# Patient Record
Sex: MALE | Race: WHITE | Employment: FULL TIME | URBAN - METROPOLITAN AREA
[De-identification: names, ages, dates, MRNs, and addresses within clinical notes are randomized per-mention and may not be internally consistent; named-entity substitution may affect disease eponyms.]

---

## 2018-09-06 ENCOUNTER — HOSPITAL ENCOUNTER (OUTPATIENT)
Dept: PREADMISSION TESTING | Age: 63
Discharge: HOME | End: 2018-09-06
Payer: COMMERCIAL

## 2018-09-06 ENCOUNTER — HOSPITAL ENCOUNTER (OUTPATIENT)
Dept: GENERAL RADIOLOGY | Age: 63
Discharge: HOME | End: 2018-09-06
Attending: UROLOGY
Payer: COMMERCIAL

## 2018-09-06 VITALS
DIASTOLIC BLOOD PRESSURE: 67 MMHG | SYSTOLIC BLOOD PRESSURE: 117 MMHG | HEART RATE: 57 BPM | RESPIRATION RATE: 16 BRPM | WEIGHT: 156.53 LBS | HEIGHT: 67 IN | TEMPERATURE: 98.2 F | OXYGEN SATURATION: 97 % | BODY MASS INDEX: 24.57 KG/M2

## 2018-09-06 LAB
ALBUMIN SERPL-MCNC: 3.8 G/DL (ref 3.5–5)
ALBUMIN/GLOB SERPL: 1.2 {RATIO} (ref 1.1–2.2)
ALP SERPL-CCNC: 60 U/L (ref 45–117)
ALT SERPL-CCNC: 23 U/L (ref 12–78)
ANION GAP SERPL CALC-SCNC: 7 MMOL/L (ref 5–15)
APPEARANCE UR: ABNORMAL
AST SERPL-CCNC: 16 U/L (ref 15–37)
BACTERIA URNS QL MICRO: ABNORMAL /HPF
BILIRUB SERPL-MCNC: 0.2 MG/DL (ref 0.2–1)
BILIRUB UR QL: NEGATIVE
BUN SERPL-MCNC: 15 MG/DL (ref 6–20)
BUN/CREAT SERPL: 12 (ref 12–20)
CALCIUM SERPL-MCNC: 8.5 MG/DL (ref 8.5–10.1)
CHLORIDE SERPL-SCNC: 105 MMOL/L (ref 97–108)
CO2 SERPL-SCNC: 27 MMOL/L (ref 21–32)
COLOR UR: ABNORMAL
CREAT SERPL-MCNC: 1.26 MG/DL (ref 0.7–1.3)
EPITH CASTS URNS QL MICRO: ABNORMAL /LPF
ERYTHROCYTE [DISTWIDTH] IN BLOOD BY AUTOMATED COUNT: 12.3 % (ref 11.5–14.5)
GLOBULIN SER CALC-MCNC: 3.3 G/DL (ref 2–4)
GLUCOSE SERPL-MCNC: 73 MG/DL (ref 65–100)
GLUCOSE UR STRIP.AUTO-MCNC: NEGATIVE MG/DL
HCT VFR BLD AUTO: 36.9 % (ref 36.6–50.3)
HGB BLD-MCNC: 12.2 G/DL (ref 12.1–17)
HGB UR QL STRIP: ABNORMAL
KETONES UR QL STRIP.AUTO: NEGATIVE MG/DL
LEUKOCYTE ESTERASE UR QL STRIP.AUTO: ABNORMAL
MCH RBC QN AUTO: 29.9 PG (ref 26–34)
MCHC RBC AUTO-ENTMCNC: 33.1 G/DL (ref 30–36.5)
MCV RBC AUTO: 90.4 FL (ref 80–99)
NITRITE UR QL STRIP.AUTO: NEGATIVE
NRBC # BLD: 0 K/UL (ref 0–0.01)
NRBC BLD-RTO: 0 PER 100 WBC
PH UR STRIP: 5.5 [PH] (ref 5–8)
PLATELET # BLD AUTO: 214 K/UL (ref 150–400)
PMV BLD AUTO: 10.4 FL (ref 8.9–12.9)
POTASSIUM SERPL-SCNC: 4 MMOL/L (ref 3.5–5.1)
PROT SERPL-MCNC: 7.1 G/DL (ref 6.4–8.2)
PROT UR STRIP-MCNC: NEGATIVE MG/DL
RBC # BLD AUTO: 4.08 M/UL (ref 4.1–5.7)
RBC #/AREA URNS HPF: ABNORMAL /HPF (ref 0–5)
SODIUM SERPL-SCNC: 139 MMOL/L (ref 136–145)
SP GR UR REFRACTOMETRY: 1.01 (ref 1–1.03)
UROBILINOGEN UR QL STRIP.AUTO: 0.2 EU/DL (ref 0.2–1)
WBC # BLD AUTO: 6 K/UL (ref 4.1–11.1)
WBC URNS QL MICRO: >100 /HPF (ref 0–4)

## 2018-09-06 PROCEDURE — 87086 URINE CULTURE/COLONY COUNT: CPT | Performed by: UROLOGY

## 2018-09-06 PROCEDURE — 81001 URINALYSIS AUTO W/SCOPE: CPT | Performed by: UROLOGY

## 2018-09-06 PROCEDURE — 93005 ELECTROCARDIOGRAM TRACING: CPT

## 2018-09-06 PROCEDURE — 80053 COMPREHEN METABOLIC PANEL: CPT | Performed by: UROLOGY

## 2018-09-06 PROCEDURE — 85027 COMPLETE CBC AUTOMATED: CPT | Performed by: UROLOGY

## 2018-09-06 PROCEDURE — 71046 X-RAY EXAM CHEST 2 VIEWS: CPT

## 2018-09-06 PROCEDURE — 36415 COLL VENOUS BLD VENIPUNCTURE: CPT | Performed by: UROLOGY

## 2018-09-06 RX ORDER — SODIUM CHLORIDE, SODIUM LACTATE, POTASSIUM CHLORIDE, CALCIUM CHLORIDE 600; 310; 30; 20 MG/100ML; MG/100ML; MG/100ML; MG/100ML
25 INJECTION, SOLUTION INTRAVENOUS CONTINUOUS
Status: CANCELLED | OUTPATIENT
Start: 2018-09-17

## 2018-09-06 RX ORDER — CEFAZOLIN SODIUM/WATER 2 G/20 ML
2 SYRINGE (ML) INTRAVENOUS ONCE
Status: CANCELLED | OUTPATIENT
Start: 2018-09-17 | End: 2018-09-17

## 2018-09-06 RX ORDER — TAMSULOSIN HYDROCHLORIDE 0.4 MG/1
0.4 CAPSULE ORAL
COMMUNITY

## 2018-09-06 NOTE — PERIOP NOTES
Spoke with lab to verify gray top for urine culture had been received. Lab confirmed receipt of gray top for urine culture and urine culture would be run. 
 
1800 Again spoke with lab to verify urine culture to be in process.

## 2018-09-06 NOTE — PERIOP NOTES
[facility] Preoperative Instructions Surgery Date 9/17/18         Time of Arrival 0630 am   Contact # [phone] 1. On the day of your surgery, please report to the Surgical Services Registration Desk and sign in at your designated time. The Surgery Center is located to the right of the Emergency Room. 2. You must have someone with you to drive you home. You should not drive a car for 24 hours following surgery. Please make arrangements for a friend or family member to stay with you for the first 24 hours after your surgery. 3. Do not have anything to eat or drink (including water, gum, mints, coffee, juice) after midnight ? 9/16/18  . ? This may not apply to medications prescribed by your physician. ?(Please note below the special instructions with medications to take the morning of your procedure.) 4. We recommend you do not drink any alcoholic beverages for 24 hours before and after your surgery. 5. Contact your surgeons office for instructions on the following medications: non-steroidal anti-inflammatory drugs (i.e. Advil, Aleve), vitamins, and supplements. (Some surgeons will want you to stop these medications prior to surgery and others may allow you to take them) **If you are currently taking Plavix, Coumadin, Aspirin and/or other blood-thinning agents, contact your surgeon for instructions. ** Your surgeon will partner with the physician prescribing these medications to determine if it is safe to stop or if you need to continue taking. Please do not stop taking these medications without instructions from your surgeon 6. Wear comfortable clothes. Wear glasses instead of contacts. Do not bring any money or jewelry. Please bring picture ID, insurance card, and any prearranged co-payment or hospital payment. Do not wear make-up, particularly mascara the morning of your surgery.   Do not wear nail polish, particularly if you are having foot /hand surgery. Wear your hair loose or down, no ponytails, buns, laurie pins or clips. All body piercings must be removed. Please shower with antibacterial soap for three consecutive days before and on the morning of surgery, but do not apply any lotions, powders or deodorants after the shower on the day of surgery. Please use a fresh towels after each shower. Please sleep in clean clothes and change bed linens the night before surgery. Please do not shave for 48 hours prior to surgery. Shaving of the face is acceptable. 7. You should understand that if you do not follow these instructions your surgery may be cancelled. If your physical condition changes (I.e. fever, cold or flu) please contact your surgeon as soon as possible. 8. It is important that you be on time. If a situation occurs where you may be late, please call [phone] (OR Holding Area). 9. If you have any questions and or problems, please call [phone] (Pre-admission Testing). 10. Your surgery time may be subject to change. You will receive a phone call the evening prior if your time changes. 11.  If having outpatient surgery, you must have someone to drive you here, stay with you during the duration of your stay, and to drive you home at time of discharge. 12.   In an effort to improve the efficiency, privacy, and safety for all of our Pre-op patients visitors are not allowed in the Holding area. Once you arrive and are registered your family/visitors will be asked to remain in the waiting room. The Pre-op staff will get you from the Surgical Waiting Area and will explain to you and your family/visitors that the Pre-op phase is beginning. The staff will answer any questions and provide instructions for tracking of the patient, by use of the existing tracking number and color-coded status board in the waiting room.   At this time the staff will also ask for your designated spokesperson information in the event that the physician or staff need to provide an update or obtain any pertinent information. The designated spokesperson will be notified if the physician needs to speak to family during the pre-operative phase. If at any time your family/visitors has questions or concerns they may approach the volunteer desk in the waiting area for assistance. Special Instructions: Use Incentive Spirometer 20 times daily prior to surgery. MEDICATIONS TO TAKE THE MORNING OF SURGERY WITH A SIP OF WATER: Flonase if needed I understand a pre-operative phone call will be made to verify my surgery time. In the event that I am not available, I give permission for a message to be left on my answering service and/or with another person? yes 
 
 
 
 ___________________      __________   _________ 
  (Signature of Patient)             (Witness)                (Date and Time)

## 2018-09-06 NOTE — PERIOP NOTES
Incentive Benito James Using the incentive spirometer helps expand the small air sacs of your lungs, helps you breathe deeply, and helps improve your lung function. Use your incentive spirometer twice a day (10 breaths each time) prior to surgery. How to Use Your Incentive Spirometer: 1. Hold the incentive spirometer in an upright position. 2. Breathe out as usual.  
3. Place the mouthpiece in your mouth and seal your lips tightly around it. 4. Take a deep breath. Breathe in slowly and as deeply as possible. Keep the blue flow rate guide between the arrows. 5. Hold your breath as long as possible. Then exhale slowly and allow the piston to fall to the bottom of the column. 6. Rest for a few seconds and repeat steps one through five at least 10 times. PAT Tidal Volume________2500__________  x_________10_______  Date______9/6/18_________________ BRING THE INCENTIVE SPIROMETER WITH YOU TO THE HOSPITAL ON THE DAY OF YOUR SURGERY. Opportunity given to ask and answer questions as well as to observe return demonstration. Patient signature_____________________________    Witness____________________________

## 2018-09-07 LAB
ATRIAL RATE: 54 BPM
CALCULATED P AXIS, ECG09: 58 DEGREES
CALCULATED R AXIS, ECG10: 6 DEGREES
CALCULATED T AXIS, ECG11: 33 DEGREES
DIAGNOSIS, 93000: NORMAL
P-R INTERVAL, ECG05: 148 MS
Q-T INTERVAL, ECG07: 408 MS
QRS DURATION, ECG06: 88 MS
QTC CALCULATION (BEZET), ECG08: 386 MS
VENTRICULAR RATE, ECG03: 54 BPM

## 2018-09-08 LAB
BACTERIA SPEC CULT: NORMAL
CC UR VC: NORMAL
SERVICE CMNT-IMP: NORMAL

## 2018-09-17 ENCOUNTER — ANESTHESIA (OUTPATIENT)
Dept: SURGERY | Age: 63
End: 2018-09-17
Payer: COMMERCIAL

## 2018-09-17 ENCOUNTER — HOSPITAL ENCOUNTER (OUTPATIENT)
Age: 63
Discharge: HOME | End: 2018-09-18
Attending: UROLOGY | Admitting: UROLOGY
Payer: COMMERCIAL

## 2018-09-17 ENCOUNTER — ANESTHESIA EVENT (OUTPATIENT)
Dept: SURGERY | Age: 63
End: 2018-09-17
Payer: COMMERCIAL

## 2018-09-17 DIAGNOSIS — Z90.79 S/P TURP: ICD-10-CM

## 2018-09-17 DIAGNOSIS — N32.0 BLADDER OUTLET OBSTRUCTION: Primary | ICD-10-CM

## 2018-09-17 DIAGNOSIS — N40.1 URINARY RETENTION DUE TO BENIGN PROSTATIC HYPERPLASIA: ICD-10-CM

## 2018-09-17 DIAGNOSIS — R33.8 URINARY RETENTION DUE TO BENIGN PROSTATIC HYPERPLASIA: ICD-10-CM

## 2018-09-17 LAB
ANION GAP SERPL CALC-SCNC: 5 MMOL/L (ref 5–15)
BUN SERPL-MCNC: 13 MG/DL (ref 6–20)
BUN/CREAT SERPL: 8 (ref 12–20)
CALCIUM SERPL-MCNC: 9.1 MG/DL (ref 8.5–10.1)
CHLORIDE SERPL-SCNC: 104 MMOL/L (ref 97–108)
CO2 SERPL-SCNC: 28 MMOL/L (ref 21–32)
CREAT SERPL-MCNC: 1.54 MG/DL (ref 0.7–1.3)
GLUCOSE SERPL-MCNC: 123 MG/DL (ref 65–100)
HCT VFR BLD AUTO: 40.1 % (ref 36.6–50.3)
HGB BLD-MCNC: 13.2 G/DL (ref 12.1–17)
POTASSIUM SERPL-SCNC: 3.9 MMOL/L (ref 3.5–5.1)
SODIUM SERPL-SCNC: 137 MMOL/L (ref 136–145)

## 2018-09-17 PROCEDURE — 77030005546 HC CATH URETH FOL 3W BARD -A: Performed by: UROLOGY

## 2018-09-17 PROCEDURE — 74011250637 HC RX REV CODE- 250/637: Performed by: UROLOGY

## 2018-09-17 PROCEDURE — 77030010545: Performed by: UROLOGY

## 2018-09-17 PROCEDURE — 74011250636 HC RX REV CODE- 250/636: Performed by: UROLOGY

## 2018-09-17 PROCEDURE — 77030018836 HC SOL IRR NACL ICUM -A: Performed by: UROLOGY

## 2018-09-17 PROCEDURE — 85018 HEMOGLOBIN: CPT | Performed by: UROLOGY

## 2018-09-17 PROCEDURE — 77030008684 HC TU ET CUF COVD -B: Performed by: ANESTHESIOLOGY

## 2018-09-17 PROCEDURE — 77030028158 HC ELECTRD BISOLSR PROST RWOL -B: Performed by: UROLOGY

## 2018-09-17 PROCEDURE — 77030020782 HC GWN BAIR PAWS FLX 3M -B

## 2018-09-17 PROCEDURE — 74011250636 HC RX REV CODE- 250/636

## 2018-09-17 PROCEDURE — 74011250636 HC RX REV CODE- 250/636: Performed by: ANESTHESIOLOGY

## 2018-09-17 PROCEDURE — 76010000153 HC OR TIME 1.5 TO 2 HR: Performed by: UROLOGY

## 2018-09-17 PROCEDURE — 36415 COLL VENOUS BLD VENIPUNCTURE: CPT | Performed by: UROLOGY

## 2018-09-17 PROCEDURE — 77030021163 HC TUBE CYSTO IRR ICUM -A: Performed by: UROLOGY

## 2018-09-17 PROCEDURE — 80048 BASIC METABOLIC PNL TOTAL CA: CPT | Performed by: UROLOGY

## 2018-09-17 PROCEDURE — 76060000034 HC ANESTHESIA 1.5 TO 2 HR: Performed by: UROLOGY

## 2018-09-17 PROCEDURE — 77030019908 HC STETH ESOPH SIMS -A: Performed by: ANESTHESIOLOGY

## 2018-09-17 PROCEDURE — 77030026438 HC STYL ET INTUB CARD -A: Performed by: ANESTHESIOLOGY

## 2018-09-17 PROCEDURE — 76210000006 HC OR PH I REC 0.5 TO 1 HR: Performed by: UROLOGY

## 2018-09-17 PROCEDURE — 77030032490 HC SLV COMPR SCD KNE COVD -B: Performed by: UROLOGY

## 2018-09-17 PROCEDURE — 88305 TISSUE EXAM BY PATHOLOGIST: CPT | Performed by: UROLOGY

## 2018-09-17 PROCEDURE — 74011000250 HC RX REV CODE- 250

## 2018-09-17 RX ORDER — CEFAZOLIN SODIUM/WATER 2 G/20 ML
2 SYRINGE (ML) INTRAVENOUS ONCE
Status: COMPLETED | OUTPATIENT
Start: 2018-09-17 | End: 2018-09-17

## 2018-09-17 RX ORDER — FENTANYL CITRATE 50 UG/ML
INJECTION, SOLUTION INTRAMUSCULAR; INTRAVENOUS AS NEEDED
Status: DISCONTINUED | OUTPATIENT
Start: 2018-09-17 | End: 2018-09-17 | Stop reason: HOSPADM

## 2018-09-17 RX ORDER — SODIUM CHLORIDE 0.9 % (FLUSH) 0.9 %
5-10 SYRINGE (ML) INJECTION EVERY 8 HOURS
Status: DISCONTINUED | OUTPATIENT
Start: 2018-09-17 | End: 2018-09-18 | Stop reason: HOSPADM

## 2018-09-17 RX ORDER — DEXAMETHASONE SODIUM PHOSPHATE 4 MG/ML
INJECTION, SOLUTION INTRA-ARTICULAR; INTRALESIONAL; INTRAMUSCULAR; INTRAVENOUS; SOFT TISSUE AS NEEDED
Status: DISCONTINUED | OUTPATIENT
Start: 2018-09-17 | End: 2018-09-17 | Stop reason: HOSPADM

## 2018-09-17 RX ORDER — MIDAZOLAM HYDROCHLORIDE 1 MG/ML
INJECTION, SOLUTION INTRAMUSCULAR; INTRAVENOUS AS NEEDED
Status: DISCONTINUED | OUTPATIENT
Start: 2018-09-17 | End: 2018-09-17 | Stop reason: HOSPADM

## 2018-09-17 RX ORDER — SODIUM CHLORIDE 0.9 % (FLUSH) 0.9 %
5-10 SYRINGE (ML) INJECTION AS NEEDED
Status: DISCONTINUED | OUTPATIENT
Start: 2018-09-17 | End: 2018-09-18 | Stop reason: HOSPADM

## 2018-09-17 RX ORDER — DOCUSATE SODIUM 100 MG/1
100 CAPSULE, LIQUID FILLED ORAL 2 TIMES DAILY
Status: DISCONTINUED | OUTPATIENT
Start: 2018-09-17 | End: 2018-09-18 | Stop reason: HOSPADM

## 2018-09-17 RX ORDER — ROCURONIUM BROMIDE 10 MG/ML
INJECTION, SOLUTION INTRAVENOUS AS NEEDED
Status: DISCONTINUED | OUTPATIENT
Start: 2018-09-17 | End: 2018-09-17 | Stop reason: HOSPADM

## 2018-09-17 RX ORDER — NALOXONE HYDROCHLORIDE 0.4 MG/ML
0.4 INJECTION, SOLUTION INTRAMUSCULAR; INTRAVENOUS; SUBCUTANEOUS AS NEEDED
Status: DISCONTINUED | OUTPATIENT
Start: 2018-09-17 | End: 2018-09-18 | Stop reason: HOSPADM

## 2018-09-17 RX ORDER — SODIUM CHLORIDE, SODIUM LACTATE, POTASSIUM CHLORIDE, CALCIUM CHLORIDE 600; 310; 30; 20 MG/100ML; MG/100ML; MG/100ML; MG/100ML
25 INJECTION, SOLUTION INTRAVENOUS CONTINUOUS
Status: DISPENSED | OUTPATIENT
Start: 2018-09-17 | End: 2018-09-18

## 2018-09-17 RX ORDER — LIDOCAINE HYDROCHLORIDE 10 MG/ML
0.1 INJECTION, SOLUTION EPIDURAL; INFILTRATION; INTRACAUDAL; PERINEURAL AS NEEDED
Status: DISCONTINUED | OUTPATIENT
Start: 2018-09-17 | End: 2018-09-18 | Stop reason: HOSPADM

## 2018-09-17 RX ORDER — MORPHINE SULFATE 2 MG/ML
2 INJECTION, SOLUTION INTRAMUSCULAR; INTRAVENOUS
Status: DISCONTINUED | OUTPATIENT
Start: 2018-09-17 | End: 2018-09-18 | Stop reason: HOSPADM

## 2018-09-17 RX ORDER — HYDROMORPHONE HYDROCHLORIDE 1 MG/ML
0.2 INJECTION, SOLUTION INTRAMUSCULAR; INTRAVENOUS; SUBCUTANEOUS
Status: DISCONTINUED | OUTPATIENT
Start: 2018-09-17 | End: 2018-09-17 | Stop reason: HOSPADM

## 2018-09-17 RX ORDER — HYDROCODONE BITARTRATE AND ACETAMINOPHEN 5; 325 MG/1; MG/1
1 TABLET ORAL
Qty: 30 TAB | Refills: 0 | Status: SHIPPED | OUTPATIENT
Start: 2018-09-17

## 2018-09-17 RX ORDER — TAMSULOSIN HYDROCHLORIDE 0.4 MG/1
0.4 CAPSULE ORAL
Status: DISCONTINUED | OUTPATIENT
Start: 2018-09-17 | End: 2018-09-18 | Stop reason: HOSPADM

## 2018-09-17 RX ORDER — ONDANSETRON 2 MG/ML
INJECTION INTRAMUSCULAR; INTRAVENOUS AS NEEDED
Status: DISCONTINUED | OUTPATIENT
Start: 2018-09-17 | End: 2018-09-17 | Stop reason: HOSPADM

## 2018-09-17 RX ORDER — LIDOCAINE HYDROCHLORIDE 20 MG/ML
INJECTION, SOLUTION EPIDURAL; INFILTRATION; INTRACAUDAL; PERINEURAL AS NEEDED
Status: DISCONTINUED | OUTPATIENT
Start: 2018-09-17 | End: 2018-09-17 | Stop reason: HOSPADM

## 2018-09-17 RX ORDER — CIPROFLOXACIN 500 MG/1
500 TABLET ORAL EVERY 12 HOURS
Status: COMPLETED | OUTPATIENT
Start: 2018-09-17 | End: 2018-09-17

## 2018-09-17 RX ORDER — PHENAZOPYRIDINE HYDROCHLORIDE 95 MG/1
190 TABLET ORAL
Qty: 15 TAB | Refills: 0 | Status: SHIPPED | OUTPATIENT
Start: 2018-09-17

## 2018-09-17 RX ORDER — OXYBUTYNIN CHLORIDE 5 MG/1
5 TABLET ORAL
Status: DISCONTINUED | OUTPATIENT
Start: 2018-09-17 | End: 2018-09-18 | Stop reason: HOSPADM

## 2018-09-17 RX ORDER — DIPHENHYDRAMINE HYDROCHLORIDE 50 MG/ML
12.5 INJECTION, SOLUTION INTRAMUSCULAR; INTRAVENOUS AS NEEDED
Status: DISCONTINUED | OUTPATIENT
Start: 2018-09-17 | End: 2018-09-17 | Stop reason: HOSPADM

## 2018-09-17 RX ORDER — NEOSTIGMINE METHYLSULFATE 1 MG/ML
INJECTION INTRAVENOUS AS NEEDED
Status: DISCONTINUED | OUTPATIENT
Start: 2018-09-17 | End: 2018-09-17 | Stop reason: HOSPADM

## 2018-09-17 RX ORDER — PROPOFOL 10 MG/ML
INJECTION, EMULSION INTRAVENOUS AS NEEDED
Status: DISCONTINUED | OUTPATIENT
Start: 2018-09-17 | End: 2018-09-17 | Stop reason: HOSPADM

## 2018-09-17 RX ORDER — ATROPA BELLADONNA AND OPIUM 16.2; 3 MG/1; MG/1
1 SUPPOSITORY RECTAL ONCE
Status: DISCONTINUED | OUTPATIENT
Start: 2018-09-17 | End: 2018-09-17 | Stop reason: HOSPADM

## 2018-09-17 RX ORDER — ONDANSETRON 2 MG/ML
4 INJECTION INTRAMUSCULAR; INTRAVENOUS
Status: DISCONTINUED | OUTPATIENT
Start: 2018-09-17 | End: 2018-09-18 | Stop reason: HOSPADM

## 2018-09-17 RX ORDER — SODIUM CHLORIDE 0.9 % (FLUSH) 0.9 %
5-10 SYRINGE (ML) INJECTION AS NEEDED
Status: DISCONTINUED | OUTPATIENT
Start: 2018-09-17 | End: 2018-09-17 | Stop reason: HOSPADM

## 2018-09-17 RX ORDER — ACETAMINOPHEN 10 MG/ML
INJECTION, SOLUTION INTRAVENOUS AS NEEDED
Status: DISCONTINUED | OUTPATIENT
Start: 2018-09-17 | End: 2018-09-17 | Stop reason: HOSPADM

## 2018-09-17 RX ORDER — CIPROFLOXACIN 250 MG/1
500 TABLET, FILM COATED ORAL 2 TIMES DAILY
Qty: 12 TAB | Refills: 0 | Status: SHIPPED | OUTPATIENT
Start: 2018-09-17 | End: 2018-09-18

## 2018-09-17 RX ORDER — ACETAMINOPHEN 325 MG/1
650 TABLET ORAL
Status: DISCONTINUED | OUTPATIENT
Start: 2018-09-17 | End: 2018-09-18 | Stop reason: HOSPADM

## 2018-09-17 RX ORDER — DIPHENHYDRAMINE HCL 25 MG
25 CAPSULE ORAL
Status: DISCONTINUED | OUTPATIENT
Start: 2018-09-17 | End: 2018-09-18 | Stop reason: HOSPADM

## 2018-09-17 RX ORDER — SODIUM CHLORIDE, SODIUM LACTATE, POTASSIUM CHLORIDE, CALCIUM CHLORIDE 600; 310; 30; 20 MG/100ML; MG/100ML; MG/100ML; MG/100ML
25 INJECTION, SOLUTION INTRAVENOUS CONTINUOUS
Status: DISCONTINUED | OUTPATIENT
Start: 2018-09-17 | End: 2018-09-18 | Stop reason: HOSPADM

## 2018-09-17 RX ORDER — EPHEDRINE SULFATE 50 MG/ML
INJECTION, SOLUTION INTRAVENOUS AS NEEDED
Status: DISCONTINUED | OUTPATIENT
Start: 2018-09-17 | End: 2018-09-17 | Stop reason: HOSPADM

## 2018-09-17 RX ORDER — SODIUM CHLORIDE, SODIUM LACTATE, POTASSIUM CHLORIDE, CALCIUM CHLORIDE 600; 310; 30; 20 MG/100ML; MG/100ML; MG/100ML; MG/100ML
25 INJECTION, SOLUTION INTRAVENOUS CONTINUOUS
Status: DISCONTINUED | OUTPATIENT
Start: 2018-09-17 | End: 2018-09-17 | Stop reason: HOSPADM

## 2018-09-17 RX ORDER — SUCCINYLCHOLINE CHLORIDE 20 MG/ML
INJECTION INTRAMUSCULAR; INTRAVENOUS AS NEEDED
Status: DISCONTINUED | OUTPATIENT
Start: 2018-09-17 | End: 2018-09-17 | Stop reason: HOSPADM

## 2018-09-17 RX ORDER — GLYCOPYRROLATE 0.2 MG/ML
INJECTION INTRAMUSCULAR; INTRAVENOUS AS NEEDED
Status: DISCONTINUED | OUTPATIENT
Start: 2018-09-17 | End: 2018-09-17 | Stop reason: HOSPADM

## 2018-09-17 RX ORDER — FENTANYL CITRATE 50 UG/ML
25 INJECTION, SOLUTION INTRAMUSCULAR; INTRAVENOUS
Status: DISCONTINUED | OUTPATIENT
Start: 2018-09-17 | End: 2018-09-17 | Stop reason: HOSPADM

## 2018-09-17 RX ORDER — HYDROCODONE BITARTRATE AND HOMATROPINE METHYLBROMIDE 1.5; 5 MG/5ML; MG/5ML
5 SYRUP ORAL
Status: DISCONTINUED | OUTPATIENT
Start: 2018-09-17 | End: 2018-09-18 | Stop reason: HOSPADM

## 2018-09-17 RX ADMIN — EPHEDRINE SULFATE 10 MG: 50 INJECTION, SOLUTION INTRAVENOUS at 08:41

## 2018-09-17 RX ADMIN — DEXAMETHASONE SODIUM PHOSPHATE 10 MG: 4 INJECTION, SOLUTION INTRA-ARTICULAR; INTRALESIONAL; INTRAMUSCULAR; INTRAVENOUS; SOFT TISSUE at 08:30

## 2018-09-17 RX ADMIN — ACETAMINOPHEN 1000 MG: 10 INJECTION, SOLUTION INTRAVENOUS at 08:52

## 2018-09-17 RX ADMIN — CIPROFLOXACIN 500 MG: 500 TABLET, FILM COATED ORAL at 12:08

## 2018-09-17 RX ADMIN — NEOSTIGMINE METHYLSULFATE 3 MG: 1 INJECTION INTRAVENOUS at 09:39

## 2018-09-17 RX ADMIN — PROPOFOL 30 MG: 10 INJECTION, EMULSION INTRAVENOUS at 08:11

## 2018-09-17 RX ADMIN — PROPOFOL 150 MG: 10 INJECTION, EMULSION INTRAVENOUS at 08:10

## 2018-09-17 RX ADMIN — DOCUSATE SODIUM 100 MG: 100 CAPSULE, LIQUID FILLED ORAL at 12:08

## 2018-09-17 RX ADMIN — Medication 10 ML: at 08:00

## 2018-09-17 RX ADMIN — FENTANYL CITRATE 25 MCG: 50 INJECTION, SOLUTION INTRAMUSCULAR; INTRAVENOUS at 08:16

## 2018-09-17 RX ADMIN — Medication 2 G: at 08:15

## 2018-09-17 RX ADMIN — FENTANYL CITRATE 25 MCG: 50 INJECTION, SOLUTION INTRAMUSCULAR; INTRAVENOUS at 08:52

## 2018-09-17 RX ADMIN — ONDANSETRON 4 MG: 2 INJECTION INTRAMUSCULAR; INTRAVENOUS at 09:24

## 2018-09-17 RX ADMIN — GLYCOPYRROLATE 0.4 MG: 0.2 INJECTION INTRAMUSCULAR; INTRAVENOUS at 09:39

## 2018-09-17 RX ADMIN — ROCURONIUM BROMIDE 5 MG: 10 INJECTION, SOLUTION INTRAVENOUS at 08:10

## 2018-09-17 RX ADMIN — MIDAZOLAM HYDROCHLORIDE 2 MG: 1 INJECTION, SOLUTION INTRAMUSCULAR; INTRAVENOUS at 08:04

## 2018-09-17 RX ADMIN — LIDOCAINE HYDROCHLORIDE 80 MG: 20 INJECTION, SOLUTION EPIDURAL; INFILTRATION; INTRACAUDAL; PERINEURAL at 08:10

## 2018-09-17 RX ADMIN — EPHEDRINE SULFATE 10 MG: 50 INJECTION, SOLUTION INTRAVENOUS at 09:34

## 2018-09-17 RX ADMIN — TAMSULOSIN HYDROCHLORIDE 0.4 MG: 0.4 CAPSULE ORAL at 22:01

## 2018-09-17 RX ADMIN — SODIUM CHLORIDE, SODIUM LACTATE, POTASSIUM CHLORIDE, AND CALCIUM CHLORIDE 25 ML/HR: 600; 310; 30; 20 INJECTION, SOLUTION INTRAVENOUS at 12:53

## 2018-09-17 RX ADMIN — ATROPA BELLADONNA AND OPIUM 1 SUPPOSITORY: 16.2; 3 SUPPOSITORY RECTAL at 09:55

## 2018-09-17 RX ADMIN — ROCURONIUM BROMIDE 20 MG: 10 INJECTION, SOLUTION INTRAVENOUS at 08:25

## 2018-09-17 RX ADMIN — HYDROCODONE BITARTRATE AND HOMATROPINE METHYLBROMIDE 5 ML: 5; 1.5 SYRUP ORAL at 12:07

## 2018-09-17 RX ADMIN — SUCCINYLCHOLINE CHLORIDE 120 MG: 20 INJECTION INTRAMUSCULAR; INTRAVENOUS at 08:10

## 2018-09-17 RX ADMIN — SODIUM CHLORIDE, SODIUM LACTATE, POTASSIUM CHLORIDE, AND CALCIUM CHLORIDE 25 ML/HR: 600; 310; 30; 20 INJECTION, SOLUTION INTRAVENOUS at 07:32

## 2018-09-17 RX ADMIN — FENTANYL CITRATE 50 MCG: 50 INJECTION, SOLUTION INTRAMUSCULAR; INTRAVENOUS at 08:10

## 2018-09-17 RX ADMIN — CIPROFLOXACIN 500 MG: 500 TABLET, FILM COATED ORAL at 22:02

## 2018-09-17 NOTE — ANESTHESIA PREPROCEDURE EVALUATION
Anesthetic History No history of anesthetic complications Review of Systems / Medical History Patient summary reviewed, nursing notes reviewed and pertinent labs reviewed Pulmonary Within defined limits Neuro/Psych Within defined limits Cardiovascular Within defined limits Exercise tolerance: >4 METS 
  
GI/Hepatic/Renal 
  
 
 
 
 
 
 Endo/Other Arthritis Other Findings Comments: BPH Urinary Retention Physical Exam 
 
Airway Mallampati: I 
TM Distance: > 6 cm Neck ROM: normal range of motion Mouth opening: Normal 
 
 Cardiovascular Regular rate and rhythm,  S1 and S2 normal,  no murmur, click, rub, or gallop Dental 
No notable dental hx Pulmonary Breath sounds clear to auscultation Abdominal 
GI exam deferred Other Findings Anesthetic Plan ASA: 2 Anesthesia type: general 
 
 
 
 
Induction: Intravenous Anesthetic plan and risks discussed with: Patient

## 2018-09-17 NOTE — IP AVS SNAPSHOT
[address] [facility] 
[phone] Patient: Nicanor Martinez MRN: [id number] ARJ:[id number] About your hospitalization You were admitted on:  September 17, 2018 You last received care in the:  [facility] 2 GENERAL SURGERY You were discharged on:  September 18, 2018 Why you were hospitalized Your primary diagnosis was:  Not on File Your diagnoses also included:  Bladder Outlet Obstruction Follow-up Information Follow up With Details Comments Contact Info Waqar Menjivar NP Schedule an appointment as soon as possible for a visit post hospital visit [id number] [place] [address] [phone] Jimbo Adams MD On 9/21/2018 @ 8am as previously scheduled [address] [facility] 
[phone] Discharge Orders None A check israel indicates which time of day the medication should be taken. My Medications START taking these medications Instructions Each Dose to Equal  
 Morning Noon Evening Bedtime HYDROcodone-acetaminophen 5-325 mg per tablet Commonly known as:  Josselin Oyster Your last dose was: Your next dose is: Take 1 Tab by mouth every four (4) hours as needed for Pain. Max Daily Amount: 6 Tabs. 1 Tab  
    
   
   
   
  
 phenazopyridine 95 mg tab Commonly known as:  PYRIDIUM Your last dose was: Your next dose is: Take 2 Tabs by mouth three (3) times daily as needed for Pain. 190 mg CONTINUE taking these medications Instructions Each Dose to Equal  
 Morning Noon Evening Bedtime FLONASE NA Your last dose was: Your next dose is:    
   
   
 by Nasal route as needed. tamsulosin 0.4 mg capsule Commonly known as:  FLOMAX Your last dose was: Your next dose is: Take 0.4 mg by mouth nightly. 0.4 mg Where to Get Your Medications Information on where to get these meds will be given to you by the nurse or doctor. ! Ask your nurse or doctor about these medications HYDROcodone-acetaminophen 5-325 mg per tablet  
 phenazopyridine 95 mg tab Opioid Education Prescription Opioids: What You Need to Know: 
 
Prescription opioids can be used to help relieve moderate-to-severe pain and are often prescribed following a surgery or injury, or for certain health conditions. These medications can be an important part of treatment but also come with serious risks. Opioids are strong pain medicines. Examples include hydrocodone, oxycodone, fentanyl, and morphine. Heroin is an example of an illegal opioid. It is important to work with your health care provider to make sure you are getting the safest, most effective care. WHAT ARE THE RISKS AND SIDE EFFECTS OF OPIOID USE? Prescription opioids carry serious risks of addiction and overdose, especially with prolonged use. An opioid overdose, often marked by slow breathing, can cause sudden death. The use of prescription opioids can have a number of side effects as well, even when taken as directed. · Tolerance-meaning you might need to take more of a medication for the same pain relief · Physical dependence-meaning you have symptoms of withdrawal when the medication is stopped. Withdrawal symptoms can include nausea, sweating, chills, diarrhea, stomach cramps, and muscle aches. Withdrawal can last up to several weeks, depending on which drug you took and how long you took it. · Increased sensitivity to pain · Constipation · Nausea, vomiting, and dry mouth · Sleepiness and dizziness · Confusion · Depression · Low levels of testosterone that can result in lower sex drive, energy, and strength · Itching and sweating RISKS ARE GREATER WITH:      
 · History of drug misuse, substance use disorder, or overdose · Mental health conditions (such as depression or anxiety) · Sleep apnea · Older age (72 years or older) · Pregnancy Avoid alcohol while taking prescription opioids. Also, unless specifically advised by your health care provider, medications to avoid include: · Benzodiazepines (such as Xanax or Valium) · Muscle relaxants (such as Soma or Flexeril) · Hypnotics (such as Ambien or Lunesta) · Other prescription opioids KNOW YOUR OPTIONS Talk to your health care provider about ways to manage your pain that don't involve prescription opioids. Some of these options may actually work better and have fewer risks and side effects. Options may include: 
· Pain relievers such as acetaminophen, ibuprofen, and naproxen · Some medications that are also used for depression or seizures · Physical therapy and exercise · Counseling to help patients learn how to cope better with triggers of pain and stress. · Application of heat or cold compress · Massage therapy · Relaxation techniques Be Informed Make sure you know the name of your medication, how much and how often to take it, and its potential risks & side effects. IF YOU ARE PRESCRIBED OPIOIDS FOR PAIN: 
· Never take opioids in greater amounts or more often than prescribed. Remember the goal is not to be pain-free but to manage your pain at a tolerable level. · Follow up with your primary care provider to: · Work together to create a plan on how to manage your pain. · Talk about ways to help manage your pain that don't involve prescription opioids. · Talk about any and all concerns and side effects. · Help prevent misuse and abuse. · Never sell or share prescription opioids · Help prevent misuse and abuse. · Store prescription opioids in a secure place and out of reach of others (this may include visitors, children, friends, and family). · Safely dispose of unused/unwanted prescription opioids: Find your community drug take-back program or your pharmacy mail-back program, or flush them down the toilet, following guidance from the Food and Drug Administration (www.fda.gov/Drugs/ResourcesForYou). · Visit www.cdc.gov/drugoverdose to learn about the risks of opioid abuse and overdose. · If you believe you may be struggling with addiction, tell your health care provider and ask for guidance or call [employer] at 2-776-123-NSZU. Discharge Instructions Transurethral Resection of the Prostate (TURP): What to Expect at [facility] Your Recovery Transurethral resection of the prostate (TURP) is surgery to remove prostate tissue. It is done when an overgrown prostate gland is pressing on the urethra and making it hard for a man to urinate. You may need a urinary catheter for a short time. It is a flexible plastic tube used to drain urine from your bladder when you can't urinate on your own. If it is still in place when you go home, your doctor will give you instructions on how to care for your catheter. For several days after surgery, you may feel burning when you urinate. Your urine may be pink for 1 to 3 weeks after surgery. You also may have bladder cramps, or spasms. Your doctor may give you medicine to help control the spasms. You may still feel like you need to urinate often in the weeks after your surgery. It often takes up to 6 weeks for this to get better. After you have healed, you may have less trouble urinating. You may have better control over starting and stopping your urine stream. And you may feel like you get more relief when you urinate. Most men can return to work or many of their usual tasks in 1 to 3 weeks. But for about 6 weeks, try to avoid heavy lifting and strenuous activities that might put extra pressure on your bladder. Most men still can have erections after surgery (if they were able to have them before surgery). But they may not ejaculate when they have an orgasm. Semen may go into the bladder instead of out through the penis. This is called retrograde ejaculation. It does not hurt and is not harmful to your health. This care sheet gives you a general idea about how long it will take for you to recover. But each person recovers at a different pace. Follow the steps below to get better as quickly as possible. How can you care for yourself at home? Activity 
  · Rest when you feel tired.  
  · Be active. Walking is a good choice.  
  · Allow your body to heal. Don't move quickly or lift anything heavy until you are feeling better.  
  · Ask your doctor when you can drive again.  
  · Many people are able to return to work within 1 to 3 weeks after surgery. It depends on the type of work you do and how you feel.  
  · Do not put anything in your rectum, such as an enema or suppository, for 4 to 6 weeks after the surgery.  
  · You may shower and take baths when your doctor says it is okay.  
  · Ask your doctor when it is okay for you to have sex. Diet 
  · You can eat your normal diet. If your stomach is upset, try bland, low-fat foods like plain rice, broiled chicken, toast, and yogurt.  
  · If your bowel movements are not regular right after surgery, try to avoid constipation and straining. Drink plenty of water. Your doctor may suggest fiber, a stool softener, or a mild laxative. Medicines 
  · Your doctor will tell you if and when you can restart your medicines. He or she will also give you instructions about taking any new medicines.  
  · If you take aspirin or some other blood thinner, be sure to talk to your doctor. He or she will tell you if and when to start taking those medicines again. Make sure that you understand exactly what your doctor wants you to do.   · Be safe with medicines. Read and follow all instructions on the label. ¨ If the doctor gave you a prescription medicine for pain, take it as prescribed. ¨ If you are not taking a prescription pain medicine, ask your doctor if you can take an over-the-counter medicine.  
  · Take your antibiotics as directed. Do not stop taking them just because you feel better. You need to take the full course of antibiotics. Follow-up care is a key part of your treatment and safety. Be sure to make and go to all appointments, and call your doctor if you are having problems. It's also a good idea to know your test results and keep a list of the medicines you take. When should you call for help? Call 911 anytime you think you may need emergency care. For example, call if: 
  · You passed out (lost consciousness).  
  · You have chest pain, are short of breath, or cough up blood.  
 Call your doctor now or seek immediate medical care if: 
  · You have pain that does not get better after you take pain medicine.  
  · You have loose stitches or your incision comes open.  
  · Bright red blood soaks through the bandage.  
  · You have signs of infection, such as: 
¨ Increased pain, swelling, warmth, or redness. ¨ Red streaks leading from the area. ¨ Pus draining from the area. ¨ A fever.  
  · You cannot urinate.  
  · You have symptoms of a urinary tract infection. These may include: 
¨ Pain or burning when you urinate. ¨ A frequent need to urinate without being able to pass much urine. ¨ Pain in the flank, which is just below the rib cage and above the waist on either side of the back. ¨ Blood in your urine. ¨ A fever.  
  · You are sick to your stomach and cannot drink fluids.  
  · You have signs of a blood clot in your leg (called a deep vein thrombosis), such as: 
¨ Pain in your calf, back of the knee, thigh, or groin. ¨ Redness or swelling in your leg or groin.  Watch closely for changes in your health, and be sure to contact your doctor if you have any problems. Where can you learn more? Go to http://inez-leeann.info/. Enter L720 in the search box to learn more about \"Transurethral Resection of the Prostate (TURP): What to Expect at Home. \" Current as of: December 3, 2017 Content Version: 11.7 © 2600-7419 [employer]. Care instructions adapted under license by [employer] (which disclaims liability or warranty for this information). If you have questions about a medical condition or this instruction, always ask your healthcare professional. [address] any warranty or liability for your use of this information. [employer] Announcement We are excited to announce that we are making your provider's discharge notes available to you in [employer]. You will see these notes when they are completed and signed by the physician that discharged you from your recent hospital stay. If you have any questions or concerns about any information you see in [employer], please call the Health Information Department where you were seen or reach out to your Primary Care Provider for more information about your plan of care. Introducing [facility]! [facility] introduces [employer] patient portal. Now you can access parts of your medical record, email your doctor's office, and request medication refills online. 1. In your internet browser, go to [email or web address] 2. Click on the First Time User? Click Here link in the Sign In box. You will see the New Member Sign Up page. 3. Enter your [employer] Access Code exactly as it appears below. You will not need to use this code after youve completed the sign-up process. If you do not sign up before the expiration date, you must request a new code. · [employer] Access Code: [email or web address] Expires: 12/13/2018  2:18 PM 
 
 4. Enter the last four digits of your Social Security Number (xxxx) and Date of Birth (mm/dd/yyyy) as indicated and click Submit. You will be taken to the next sign-up page. 5. Create a [employer] ID. This will be your [employer] login ID and cannot be changed, so think of one that is secure and easy to remember. 6. Create a [employer] password. You can change your password at any time. 7. Enter your Password Reset Question and Answer. This can be used at a later time if you forget your password. 8. Enter your e-mail address. You will receive e-mail notification when new information is available in [address] 9. Click Sign Up. You can now view and download portions of your medical record. 10. Click the Download Summary menu link to download a portable copy of your medical information. If you have questions, please visit the Frequently Asked Questions section of the [employer] website. Remember, [employer] is NOT to be used for urgent needs. For medical emergencies, dial 911. Now available from your iPhone and Android! Introducing Harry Oliveira As a [facility] patient, I wanted to make you aware of our electronic visit tool called Harry Oliveira. [facility] 24/7 allows you to connect within minutes with a medical provider 24 hours a day, seven days a week via a mobile device or tablet or logging into a secure website from your computer. You can access Harry Brentjanelfin from anywhere in the [place]. A virtual visit might be right for you when you have a simple condition and feel like you just dont want to get out of bed, or cant get away from work for an appointment, when your regular [facility] provider is not available (evenings, weekends or holidays), or when youre out of town and need minor care.   Electronic visits cost only $49 and if the Harry Brenttennille provider determines a prescription is needed to treat your condition, one can be electronically transmitted to a nearby pharmacy*. Please take a moment to enroll today if you have not already done so. The enrollment process is free and takes just a few minutes. To enroll, please download the [employer] 24/7 elizabeth to your tablet or phone, or visit [email or web address] to enroll on your computer. And, as an [address] [place] [address] patient with a [employer] account, the results of your visits will be scanned into your electronic medical record and your primary care provider will be able to view the scanned results. We urge you to continue to see your regular [employer] provider for your ongoing medical care. And while your primary care provider may not be the one available when you seek a [employer] virtual visit, the peace of mind you get from getting a real diagnosis real time can be priceless. For more information on [employer], view our Frequently Asked Questions (FAQs) at [email or web address]. Sincerely, 
 
Mitzi Griffiths MD 
Chief Medical Officer [address] [place] [address] *:  certain medications cannot be prescribed via [employer] Providers Seen During Your Hospitalization Provider Specialty Primary office phone Shima Wiseman MD Urology [phone] Your Primary Care Physician (PCP) Primary Care Physician Office Phone Office Fax Hannah Magaña [phone] You are allergic to the following No active allergies Recent Documentation Height Weight BMI Smoking Status 1.689 m 70.4 kg 24.68 kg/m2 Never Smoker Emergency Contacts Name Discharge Info Relation Home Work Mobile [facility] DISCHARGE CAREGIVER [3] Spouse [3] [phone] Patient Belongings  The following personal items are in your possession at time of discharge: 
  Dental Appliances: None  Visual Aid: Glasses, With patient      Home Medications: None   Jewelry: None  Clothing: Shirt, Footwear, Pants, Undergarments    Other Valuables: Eyeglasses  Personal Items Sent to Safe: na 
 
  
  
 Please provide this summary of care documentation to your next provider. Signatures-by signing, you are acknowledging that this After Visit Summary has been reviewed with you and you have received a copy. Patient Signature:  ____________________________________________________________ Date:  ____________________________________________________________  
  
[employer] Provider Signature:  ____________________________________________________________ Date:  ____________________________________________________________

## 2018-09-17 NOTE — PERIOP NOTES
Handoff Report from Operating Room to PACU Report received from Valentino Rebolledo CRNA regarding Aleida Mckinney. Surgeon(s): 
Jeovany Decker MD  And Procedure(s) (LRB): 
BIPOLAR TRANSURETHRAL RESECTION OF PROSTATE (N/A)  confirmed  
with allergies, drains and dressings discussed. Anesthesia type, drugs, patient history, complications, estimated blood loss, vital signs, intake and output, and last pain medication, lines, reversal medications and temperature were reviewed.

## 2018-09-17 NOTE — ROUTINE PROCESS
Patient: Tiffany Torres MRN: [id number]  SSN: [id number] YOB: 1955  Age: 66 y.o. Sex: male Patient is status post Procedure(s): BIPOLAR TRANSURETHRAL RESECTION OF PROSTATE. Surgeon(s) and Role: Justo Garcia MD - Primary Local/Dose/Irrigation:  NONE Peripheral IV 09/17/18 Right Antecubital (Active) Site Assessment Clean, dry, & intact 9/17/2018  7:30 AM  
Phlebitis Assessment 0 9/17/2018  7:30 AM  
Infiltration Assessment 0 9/17/2018  7:30 AM  
Dressing Status Clean, dry, & intact 9/17/2018  7:30 AM  
Dressing Type Tape;Transparent 9/17/2018  7:30 AM  
Hub Color/Line Status Pink; Infusing 9/17/2018  7:30 AM  
                 
 
 
 
Dressing/Packing:    
Splint/Cast:  ] Other:  22 FR 3 WAY HAAS IN PLACE WITH IRRIGATION

## 2018-09-17 NOTE — BRIEF OP NOTE
BRIEF OPERATIVE NOTE Date of Procedure: 9/17/2018 Preoperative Diagnosis: URINARY RETENTION Postoperative Diagnosis: urinary retention Procedure(s): BIPOLAR TRANSURETHRAL RESECTION OF PROSTATE Surgeon(s) and Role: Danisha Rodriguez MD - Primary Surgical Assistant: PÉREZ Surgical Staff: 
Circ-1: Taras Resendiz RN Scrub Tech-1: Lauren Patel Event Time In Incision Start 2169 Incision Close Anesthesia: General  
Estimated Blood Loss: 20cc Specimens:  
ID Type Source Tests Collected by Time Destination 1 : PROSTATE CHIPS Preservative Prostate  Lola Barrientos MD 9/17/2018 8664 Pathology Findings: moderate bilobar coaptation of prostate, small intravesical median lobe Severe trabeculation of bladder with cellules Bilateral ureteral orifices identified and preserved Complications: none immediate Implants: * No implants in log *  
22fr 3 way

## 2018-09-17 NOTE — DISCHARGE INSTRUCTIONS
Transurethral Resection of the Prostate (TURP): What to Expect at [facility]    Transurethral resection of the prostate (TURP) is surgery to remove prostate tissue. It is done when an overgrown prostate gland is pressing on the urethra and making it hard for a man to urinate. You may need a urinary catheter for a short time. It is a flexible plastic tube used to drain urine from your bladder when you can't urinate on your own. If it is still in place when you go home, your doctor will give you instructions on how to care for your catheter. For several days after surgery, you may feel burning when you urinate. Your urine may be pink for 1 to 3 weeks after surgery. You also may have bladder cramps, or spasms. Your doctor may give you medicine to help control the spasms. You may still feel like you need to urinate often in the weeks after your surgery. It often takes up to 6 weeks for this to get better. After you have healed, you may have less trouble urinating. You may have better control over starting and stopping your urine stream. And you may feel like you get more relief when you urinate. Most men can return to work or many of their usual tasks in 1 to 3 weeks. But for about 6 weeks, try to avoid heavy lifting and strenuous activities that might put extra pressure on your bladder. Most men still can have erections after surgery (if they were able to have them before surgery). But they may not ejaculate when they have an orgasm. Semen may go into the bladder instead of out through the penis. This is called retrograde ejaculation. It does not hurt and is not harmful to your health. This care sheet gives you a general idea about how long it will take for you to recover. But each person recovers at a different pace. Follow the steps below to get better as quickly as possible. How can you care for yourself at home? Activity    · Rest when you feel tired.     · Be active. Walking is a good choice.   · Allow your body to heal. Don't move quickly or lift anything heavy until you are feeling better.     · Ask your doctor when you can drive again.     · Many people are able to return to work within 1 to 3 weeks after surgery. It depends on the type of work you do and how you feel.     · Do not put anything in your rectum, such as an enema or suppository, for 4 to 6 weeks after the surgery.     · You may shower and take baths when your doctor says it is okay.     · Ask your doctor when it is okay for you to have sex. Diet    · You can eat your normal diet. If your stomach is upset, try bland, low-fat foods like plain rice, broiled chicken, toast, and yogurt.     · If your bowel movements are not regular right after surgery, try to avoid constipation and straining. Drink plenty of water. Your doctor may suggest fiber, a stool softener, or a mild laxative. Medicines    · Your doctor will tell you if and when you can restart your medicines. He or she will also give you instructions about taking any new medicines.     · If you take aspirin or some other blood thinner, be sure to talk to your doctor. He or she will tell you if and when to start taking those medicines again. Make sure that you understand exactly what your doctor wants you to do.     · Be safe with medicines. Read and follow all instructions on the label. ¨ If the doctor gave you a prescription medicine for pain, take it as prescribed. ¨ If you are not taking a prescription pain medicine, ask your doctor if you can take an over-the-counter medicine.     · Take your antibiotics as directed. Do not stop taking them just because you feel better. You need to take the full course of antibiotics. Follow-up care is a key part of your treatment and safety. Be sure to make and go to all appointments, and call your doctor if you are having problems. It's also a good idea to know your test results and keep a list of the medicines you take.   When should you call for help? Call 911 anytime you think you may need emergency care. For example, call if:    · You passed out (lost consciousness).     · You have chest pain, are short of breath, or cough up blood.    Call your doctor now or seek immediate medical care if:    · You have pain that does not get better after you take pain medicine.     · You have loose stitches or your incision comes open.     · Bright red blood soaks through the bandage.     · You have signs of infection, such as:  ¨ Increased pain, swelling, warmth, or redness. ¨ Red streaks leading from the area. ¨ Pus draining from the area. ¨ A fever.     · You cannot urinate.     · You have symptoms of a urinary tract infection. These may include:  ¨ Pain or burning when you urinate. ¨ A frequent need to urinate without being able to pass much urine. ¨ Pain in the flank, which is just below the rib cage and above the waist on either side of the back. ¨ Blood in your urine. ¨ A fever.     · You are sick to your stomach and cannot drink fluids.     · You have signs of a blood clot in your leg (called a deep vein thrombosis), such as:  ¨ Pain in your calf, back of the knee, thigh, or groin. ¨ Redness or swelling in your leg or groin.    Watch closely for changes in your health, and be sure to contact your doctor if you have any problems. Where can you learn more? Go to http://inez-leeann.info/. Enter K965 in the search box to learn more about \"Transurethral Resection of the Prostate (TURP): What to Expect at Home. \"  Current as of: December 3, 2017  Content Version: 11.7  © 1398-0472 [employer]. Care instructions adapted under license by [employer] (which disclaims liability or warranty for this information).  If you have questions about a medical condition or this instruction, always ask your healthcare professional. [address]  any warranty or liability for your use of this information.

## 2018-09-17 NOTE — DISCHARGE SUMMARY
Urology Discharge Summary    Patient: Carlito Young MRN: [id number]  SSN: [id number]    YOB: 1955  Age: 66 y.o. Sex: male                 FOLLOWUP: Follow-up Appointments   Procedures    FOLLOW UP VISIT Appointment in: Other (Judson Shaw) As scheduled     As scheduled     Standing Status:   Standing     Number of Occurrences:   1     Order Specific Question:   Appointment in     Answer: Other (Specify)      MEDS: Current Discharge Medication List      START taking these medications    Details   phenazopyridine (PYRIDIUM) 95 mg tab Take 2 Tabs by mouth three (3) times daily as needed for Pain. Qty: 15 Tab, Refills: 0    Associated Diagnoses: Bladder outlet obstruction; Urinary retention due to benign prostatic hyperplasia; S/P TURP      HYDROcodone-acetaminophen (NORCO) 5-325 mg per tablet Take 1 Tab by mouth every four (4) hours as needed for Pain. Max Daily Amount: 6 Tabs. Qty: 30 Tab, Refills: 0    Associated Diagnoses: Bladder outlet obstruction; Urinary retention due to benign prostatic hyperplasia; S/P TURP         CONTINUE these medications which have NOT CHANGED    Details   tamsulosin (FLOMAX) 0.4 mg capsule Take 0.4 mg by mouth nightly. fluticasone propionate (FLONASE NA) by Nasal route as needed. ADMISSION: 9/17/2018 by Joel Lauren MD to    The primary encounter diagnosis was Bladder outlet obstruction. Diagnoses of Urinary retention due to benign prostatic hyperplasia and S/P TURP were also pertinent to this visit.    DISCHARGE: 9/18/2018 5:33 AM to     PROCEDURES: 1 Day Post-Op Procedure(s):  BIPOLAR TRANSURETHRAL RESECTION OF PROSTATE   CONSULTS: None   RECENT LABS: Lab Results   Component Value Date/Time    WBC 6.0 09/06/2018 04:19 PM    HCT 40.1 09/17/2018 12:46 PM    PLATELET 328 42/28/7633 04:19 PM    Sodium 137 09/17/2018 12:46 PM    Potassium 3.9 09/17/2018 12:46 PM    Chloride 104 09/17/2018 12:46 PM    CO2 28 09/17/2018 12:46 PM    BUN 13 09/17/2018 12:46 PM    Creatinine 1.54 (H) 09/17/2018 12:46 PM    Glucose 123 (H) 09/17/2018 12:46 PM    Calcium 9.1 09/17/2018 12:46 PM      CALL FOR: If fever > 101 F, wound redness, discharge, foul odor, inability to urinate (or catheter not draining if in place), worsening abdominal pain, distension, or vomiting please call Call [facility], Shima Wiseman MD,at [phone]. IMAGING: none  PATHOLOGY: pending    HOSPITAL COURSE: uncomplicated. COMPLICATIONS: None identified. DISCHARGE TO:     Home.     Shima Wiseman MD 9/18/2018 5:33 AM

## 2018-09-17 NOTE — ANESTHESIA POSTPROCEDURE EVALUATION
Post-Anesthesia Evaluation and Assessment Patient: Tiffany Torres MRN: [id number]  SSN: [id number] YOB: 1955  Age: 66 y.o. Sex: male Cardiovascular Function/Vital Signs Visit Vitals  /73 (BP 1 Location: Right arm, BP Patient Position: At rest)  Pulse 66  Temp 36.3 °C (97.4 °F)  Resp 16  
 Ht 5' 6.5\" (1.689 m)  Wt 70.4 kg (155 lb 3.3 oz)  SpO2 99%  BMI 24.68 kg/m2 Patient is status post general anesthesia for Procedure(s): BIPOLAR TRANSURETHRAL RESECTION OF PROSTATE. Nausea/Vomiting: None Postoperative hydration reviewed and adequate. Pain: 
Pain Scale 1: Numeric (0 - 10) (09/17/18 1045) Pain Intensity 1: 0 (09/17/18 1025) Managed Neurological Status:  
Neuro (WDL): Within Defined Limits (09/17/18 1006) Neuro Neurologic State: Alert (09/17/18 1006) Orientation Level: Oriented to person;Oriented to place;Oriented to situation (09/17/18 1006) Cognition: Follows commands (09/17/18 1006) Speech: Clear (09/17/18 1006) LUE Motor Response: Purposeful (09/17/18 1006) LLE Motor Response: Purposeful (09/17/18 1006) RUE Motor Response: Purposeful (09/17/18 1006) RLE Motor Response: Purposeful (09/17/18 1006) At baseline Mental Status and Level of Consciousness: Arousable Pulmonary Status:  
O2 Device: Room air (09/17/18 1045) Adequate oxygenation and airway patent Complications related to anesthesia: None Post-anesthesia assessment completed. No concerns Signed By: Arvind Griggs MD   
 September 17, 2018

## 2018-09-17 NOTE — PROGRESS NOTES
Bedside shift change report given to Paulo Mcnamara (oncoming nurse) by Vee Stanton (offgoing nurse). Report included the following information SBAR, Kardex, Procedure Summary, Intake/Output, MAR and Recent Results.

## 2018-09-17 NOTE — PROGRESS NOTES
Problem: General Medical Care Plan Goal: *Skin integrity maintained Outcome: Resolved/Met Date Met: 09/17/18 WDL Goal: *Optimize nutritional status Outcome: Resolved/Met Date Met: 09/17/18 Reg diet Goal: *Anxiety reduced or absent Outcome: Resolved/Met Date Met: 09/17/18 
denies

## 2018-09-17 NOTE — PROGRESS NOTES
TRANSFER - IN REPORT: 
 
Verbal report received from Melissa benítez(name) on Geoffrey Gaets  being received from PACU (unit) for routine post - op Report consisted of patients Situation, Background, Assessment and  
Recommendations(SBAR). Information from the following report(s) SBAR, Kardex, Procedure Summary, Intake/Output, MAR and Recent Results was reviewed with the receiving nurse. Opportunity for questions and clarification was provided. Assessment completed upon patients arrival to unit and care assumed.

## 2018-09-17 NOTE — PERIOP NOTES
TRANSFER - OUT REPORT: 
 
Verbal report given to Debbie Martinez RN (name) on Aleida Mckinney  being transferred to 2114 
 (unit) for routine progression of care Report consisted of patients Situation, Background, Assessment and  
Recommendations(SBAR). Information from the following report(s) SBAR, OR Summary, Intake/Output, MAR, Recent Results and Cardiac Rhythm NSR was reviewed with the receiving nurse. Opportunity for questions and clarification was provided. Patient transported with: 
 [employer]

## 2018-09-17 NOTE — OP NOTES
UROLOGY OPERATIVE NOTE    Patient: Linda Rosenbaum MRN: [id number]  SSN: [id number]    YOB: 1955  Age: 66 y.o. Sex: male          Pre-operative Diagnosis: URINARY RETENTION, bladder outlet obstruction  Post-operative Diagnosis: urinary retention, bladder outlet obstruction  Surgeon: Seema Winslow MD  Assistant: Surgeon(s):  Seema Winslow MD  Anesthesia:  General    Procedure Performed: transurethral resection of prostate- bipolar      Procedure Details: The patient was seen in the pre-operative area. The risks, benefits, complications, alternative treatment options, and expected outcomes were again discussed with the patient. The possibilities of reaction to medication, pain, infection, bleeding, major cardiovascular event, death, damage to surrounding structures were specifically addressed. Informed consent was then obtained. The site of surgery properly noted/marked. Upon arrival to the operative suite, the patient, procedure, and side were confirmed via a pre-operative \"time-out. \" All were in agreement. The patient was positioned on the operating room table, bilateral sequential compression devices were applied, and anesthesia was induced. Perioperative antibiotics were given. The patient was then placed in the dorsal lithotomy position and prepped and draped in usual sterile fashion. A well-lubricated 21 Naur[place] 30 degree cystoscope was inserted per urethral into the bladder without difficulty. There was moderate bilobar coaptation of the prostate with a small intravesical median lobe. The bladder was drained of urine. Pancystoscopy was performed. There were no bladder tumors or bladder stones. The bladder was severely trabeculated with celluels. The bilateral ureteral orifices were in orthotopic position. A 27fr resectoscope was passed easily per urethra after dilation of the fossa navicularis to 28fr with Arvella Reges buren sounds. The ureteral orifices were marked.   Using the resectoscope in the cut mode, the intravesical median lobe was taken down to be flush with the bladder neck. The the left lateral lobe was resected from the bladder neck to the verumontanum down to the circular fibers of the prostate capsule. There was no capsular perforatio or resection distal to the verumontanum. The same was done on the right lateral lobe. A small amount of apical tissue was left to avoid resecting too close to the sphincter. There was no signficant anterior tissue to resect. Hemostasis was achieved with the resectoscope loop in the coag mode. The chips were irrigated from the bladder with the [facility] evacuator. The chips were sent as a specimen. The resectoscope was removed when all the chips were removed. There was no injury to the sphincter. A 22fr 3 way was inserted with return of clear irrigant. The balloon was inflated with 30cc of sterile water. The catheter was hooked to continuous bladder irrigation. The prostate was about 35gm on digital rectal exam with no nodules. A B+O suppository was placed. He was returned to the supine position on the operating room table, awakened from anesthesia, extubated in the operating room, and taken to recovery in stable condition.       Estimated Blood Loss: 20cc  Specimens:   ID Type Source Tests Collected by Time Destination   1 : 8[facility]  Kailee Leung MD 9/17/2018 0913 Pathology      Findings: moderate bilobar coaptation of prostate, small intravesical median lobe  Severe trabeculation of bladder with cellules  Bilateral ureteral orifices identified and preserved   Complications: none immediate  Implants: * No implants in log *   22fr 3 way                   Kailee Leung MD

## 2018-09-18 VITALS
DIASTOLIC BLOOD PRESSURE: 62 MMHG | WEIGHT: 155.2 LBS | HEART RATE: 72 BPM | HEIGHT: 67 IN | BODY MASS INDEX: 24.36 KG/M2 | SYSTOLIC BLOOD PRESSURE: 105 MMHG | OXYGEN SATURATION: 98 % | RESPIRATION RATE: 16 BRPM | TEMPERATURE: 98 F

## 2018-09-18 LAB
ANION GAP SERPL CALC-SCNC: 8 MMOL/L (ref 5–15)
BUN SERPL-MCNC: 14 MG/DL (ref 6–20)
BUN/CREAT SERPL: 10 (ref 12–20)
CALCIUM SERPL-MCNC: 9.1 MG/DL (ref 8.5–10.1)
CHLORIDE SERPL-SCNC: 101 MMOL/L (ref 97–108)
CO2 SERPL-SCNC: 27 MMOL/L (ref 21–32)
CREAT SERPL-MCNC: 1.41 MG/DL (ref 0.7–1.3)
GLUCOSE SERPL-MCNC: 140 MG/DL (ref 65–100)
HCT VFR BLD AUTO: 36.5 % (ref 36.6–50.3)
HGB BLD-MCNC: 12.1 G/DL (ref 12.1–17)
POTASSIUM SERPL-SCNC: 3.9 MMOL/L (ref 3.5–5.1)
SODIUM SERPL-SCNC: 136 MMOL/L (ref 136–145)

## 2018-09-18 PROCEDURE — 74011250637 HC RX REV CODE- 250/637: Performed by: UROLOGY

## 2018-09-18 PROCEDURE — 80048 BASIC METABOLIC PNL TOTAL CA: CPT | Performed by: UROLOGY

## 2018-09-18 PROCEDURE — 36415 COLL VENOUS BLD VENIPUNCTURE: CPT | Performed by: UROLOGY

## 2018-09-18 PROCEDURE — 85018 HEMOGLOBIN: CPT | Performed by: UROLOGY

## 2018-09-18 RX ADMIN — DOCUSATE SODIUM 100 MG: 100 CAPSULE, LIQUID FILLED ORAL at 10:23

## 2018-09-18 RX ADMIN — Medication 10 ML: at 10:23

## 2018-09-18 RX ADMIN — Medication 10 ML: at 10:24

## 2018-09-18 NOTE — PROGRESS NOTES
Patient received discharge instructions and verbalized understanding. His IV was removed and castillo bag was emptied, measured, and documented. Patient stated he wants to wait in CCU waiting room to charge his phone while he waits for his wife to come.

## 2018-09-18 NOTE — PROGRESS NOTES
Progress Note Patient: Tabitha Gonzalez MRN: [id number]  SSN: [id number] YOB: 1955 Age: 66 y.o. Sex: male Height: Height: 5' 6.5\" (168.9 cm) Weight: Weight: 70.4 kg (155 lb 3.3 oz) BMI: Body mass index is 24.68 kg/(m^2). PCP: Fabrizio Yates  [phone] Contact:  Primary Emergency Contact: Rodolfo, Home Phone: [phone] Hospital Day: 2 - Admitted 2018  6:52 AM by Sunny Virgen MD Surgeon(s): 
Sunny Virgen MD 1 Day Post-Op Procedure(s): BIPOLAR TRANSURETHRAL RESECTION OF PROSTATE Assessment/Plan: 
  
Hernández with urinary retention pod 1 sp bipolar turp 
-clamp cbi 
-oob 
-if urine clear ok to dc home 
-cont regular diet 
-pain control prn  
 
S: naeon. Only needed one pain pill, no catheter issues Imaging: N/A Exam: Webster draining clear on minimal cbi  
ROS:  Denies Chest Pain, SOB ID: Temp (24hrs), Av.7 °F (36.5 °C), Min:97.3 °F (36.3 °C), Max:98 °F (36.7 °C) 
 
2018: WBC 6.0 K/uL (Ref range: 4.1 - 11.1 K/uL) Current Antimicrobial Therapy (168h ago through future) Ordered     Start Stop  
 18 1009  ciprofloxacin HCl (CIPRO) 250 mg tablet  500 mg,   Oral,   2 TIMES DAILY    
 18 0000 --  
  
 
Cultures: All Micro Results None Pulm: Room air 2 l/min 98 % IS:   of predicted GI: Intake: DIET REGULAR   Appetite: Good % Diet Eaten: 75 %   P.O.: 240 mL Abdominal Assessment: Intact Bowel Sounds: Active No data found. Pain: 0/10   -   - Current Analgesic Therapy (168h ago through future)  Ordered     Start Stop  
 18 1137  acetaminophen (TYLENOL) tablet 650 mg  650 mg,   Oral,   EVERY 4 HOURS AS NEEDED    
 18 1137 --  
 18 1137  morphine injection 2 mg  2 mg,   IntraVENous,   EVERY 4 HOURS AS NEEDED    
 18 1137 --  
 18 1009  HYDROcodone-acetaminophen (NORCO) 5-325 mg per tablet  1 Tab,   Oral,   EVERY 4 HOURS AS NEEDED    
 09/17/18 0000 --  
  
  
:   
Continous bladder irrigation - Urinary Catheter 09/17/18 2- way; Webster; 3- way-Status: Draining;Patent 9/6/2018: Creatinine 1.26 MG/DL (Ref range: 0.70 - 1.30 MG/DL) 
9/17/2018: Creatinine 1.54 MG/DL* (Ref range: 0.70 - 1.30 MG/DL) No results for input(s): FCREA in the last 336 hours. Lines: Peripheral IV 09/17/18 Right Antecubital (Active) Site Assessment Clean, dry, & intact 9/18/2018  2:20 AM  
Phlebitis Assessment 0 9/18/2018  2:20 AM  
Infiltration Assessment 0 9/18/2018  2:20 AM  
Dressing Status Clean, dry, & intact 9/18/2018  2:20 AM  
Dressing Type Transparent 9/18/2018  2:20 AM  
Hub Color/Line Status Pink; Infusing 9/18/2018  2:20 AM  
 
  
  
   
   
Vitals: O2 Device: Room air O2 Flow Rate (L/min): 2 l/min Patient Vitals for the past 24 hrs: 
 BP Temp Pulse Resp SpO2 Height Weight  
09/18/18 0400 116/54 97.7 °F (36.5 °C) 68 17 98 % - -  
09/17/18 2330 133/77 98 °F (36.7 °C) 73 16 97 % - -  
09/17/18 2015 126/62 97.9 °F (36.6 °C) 62 17 97 % - -  
09/17/18 1456 133/72 97.3 °F (36.3 °C) 74 18 96 % - -  
09/17/18 1252 150/80 - 63 18 95 % - -  
09/17/18 1210 132/72 - 62 16 99 % - -  
09/17/18 1140 134/70 97.7 °F (36.5 °C) 60 16 100 % - -  
09/17/18 1100 151/69 - 61 16 98 % - -  
09/17/18 1045 139/73 97.4 °F (36.3 °C) 66 16 99 % - -  
09/17/18 1030 132/70 - 60 18 99 % - -  
09/17/18 1025 142/71 - 68 17 99 % - -  
09/17/18 1020 144/67 - 62 13 99 % - -  
09/17/18 1015 139/79 - 60 12 99 % - -  
09/17/18 1010 143/77 - 66 13 99 % - -  
09/17/18 1005 140/77 97.8 °F (36.6 °C) 66 16 98 % - -  
09/17/18 0719 140/75 98 °F (36.7 °C) (!) 57 16 100 % 5' 6.5\" (1.689 m) 70.4 kg (155 lb 3.3 oz) I&O's:   
Date 09/17/18 0700 - 09/18/18 3912 09/18/18 0700 - 09/19/18 1379 Shift 8205-0802 6726-1260 24 Hour Total 6184-0917 0997-7763 24 Hour Total  
I 
N 
T 
A 
K 
E 
 P.O. 480  480     
   P. O. 480  480 I.V. 
(mL/kg/hr) 800 (0.9)  800 Volume (lactated Ringers infusion) 800  800 Other 2150 74555 74 572 545 Irrigation Volume Input (mL) (Urinary Catheter 09/17/18 2- way; Webster; 3- way) 2150 52895 91104 Shift Total 
(mL/kg) 3430 
(48.7) 42103 
(213.1) 43219 
(261.8) O 
U T 
P 
U [employer] Urine (mL/kg/hr) 7450 
(8.8) 38591 05833 Urine 1550  1550 Urine Occurrence(s) 1 x  1 x Urine Output 0  0 Urine Output (mL) (Urinary Catheter 09/17/18 2- way; [place]; 3- [id number] Blood 0  0 Estimated Blood Loss 0  0 Shift Total 
(mL/kg) 7450 
(105.8) 45784 
(288.7) 58797 
(394.5) [facility]2632 -8929 -3181 Weight (kg) 70.4 70.4 70.4 70.4 70.4 70.4 Meds:   
Current Facility-Administered Medications:  
  lactated Ringers infusion, 25 mL/hr, IntraVENous, CONTINUOUS, Isidro Srinivasan MD, Last Rate: 25 mL/hr at 09/17/18 1253, 25 mL/hr at 09/17/18 1253   sodium chloride (NS) flush 5-10 mL, 5-10 mL, IntraVENous, Q8H, Isidro Srinivasan MD, Stopped at 09/17/18 1400 
  sodium chloride (NS) flush 5-10 mL, 5-10 mL, IntraVENous, PRN, Isidro Srinivasan MD 
  lidocaine (PF) (XYLOCAINE) 10 mg/mL (1 %) injection 0.1 mL, 0.1 mL, SubCUTAneous, PRN, Isidro Srinivasan MD 
  lactated Ringers infusion, 25 mL/hr, IntraVENous, CONTINUOUS, Isidro Srinivasan MD, Last Rate: 25 mL/hr at 09/17/18 0732, 25 mL/hr at 09/17/18 0732   tamsulosin (FLOMAX) capsule 0.4 mg, 0.4 mg, Oral, QHS, Seema Winslow MD, 0.4 mg at 09/17/18 2201 
  sodium chloride (NS) flush 5-10 mL, 5-10 mL, IntraVENous, Q8H, Seema Winslow MD, Stopped at 09/17/18 1400 
  sodium chloride (NS) flush 5-10 mL, 5-10 mL, IntraVENous, PRN, Seema Winslow MD 
  HYDROcodone-homatropine [facility]) 5-1.5 mg/5 mL (5 mL) syrup 5 mL, 5 mL, Oral, Q4H PRN, Seema Winslow MD, 5 mL at 09/17/18 1207   ondansetron (ZOFRAN) injection 4 mg, 4 mg, IntraVENous, Q6H PRN, Seema Winslow MD 
   acetaminophen (TYLENOL) tablet 650 mg, 650 mg, Oral, Q4H PRN, Mindi Pereyra MD 
  morphine injection 2 mg, 2 mg, IntraVENous, Q4H PRN, Mindi Pereyra MD 
  naloxone [facility]) injection 0.4 mg, 0.4 mg, IntraVENous, PRN, Mindi Pereyra MD 
  diphenhydrAMINE (BENADRYL) capsule 25 mg, 25 mg, Oral, Q4H PRN, Mindi Pereyra MD 
  docusate sodium (COLACE) capsule 100 mg, 100 mg, Oral, BID, Mindi Pereyra MD, 100 mg at 09/17/18 1208 
  oxybutynin (DITROPAN) tablet 5 mg, 5 mg, Oral, Q6H PRN, Mindi Pereyra MD  
Labs: Recent Labs  
   09/17/18 
 1246 HGB  13.2 HCT  40.1 Recent Labs  
   09/17/18 
 1246 NA  137  
K  3.9 CL  104 CO2  28 GLU  123* BUN  13  
CREA  1.54* CA  9.1 Signed By: Mindi Pereyra MD - September 18, 2018

## 2018-09-18 NOTE — PROGRESS NOTES
Handoff Report given to [facility] RN. Pt reported no pain overnight and vitals remained stable. CBI continued overnight, output remained clear. Approx [id number] Dr. Evangelist Leos assessed patient, put in orders for discharge, and requested pt ambulate in hallway. After pt ambulated pt reported bloody output in castillo. Assessed and found bloody drainage and several large clots. Paged  On call for Evangelist Leos at 6:33am. No callback from MD. Paged  On call again at 6:55am. Received phone-call shortly thereafter from MD on call and he requested that Dr. Evangelist Leos personally assess the pt situation. Received call from Dr. Evangelist Leos at 7:00am and she approved the patient to continue with plan of discharge.   
[place]- ROSALBA

## 2022-03-20 PROBLEM — N32.0 BLADDER OUTLET OBSTRUCTION: Status: ACTIVE | Noted: 2018-09-17

## 2024-12-12 ENCOUNTER — OFFICE VISIT (OUTPATIENT)
Age: 69
End: 2024-12-12
Payer: MEDICARE

## 2024-12-12 VITALS — HEIGHT: 66 IN | BODY MASS INDEX: 25.71 KG/M2 | WEIGHT: 160 LBS

## 2024-12-12 DIAGNOSIS — M25.561 CHRONIC PAIN OF RIGHT KNEE: Primary | ICD-10-CM

## 2024-12-12 DIAGNOSIS — M17.11 PRIMARY OSTEOARTHRITIS OF RIGHT KNEE: ICD-10-CM

## 2024-12-12 DIAGNOSIS — G89.29 CHRONIC PAIN OF RIGHT KNEE: Primary | ICD-10-CM

## 2024-12-12 PROCEDURE — G8484 FLU IMMUNIZE NO ADMIN: HCPCS | Performed by: ORTHOPAEDIC SURGERY

## 2024-12-12 PROCEDURE — 1159F MED LIST DOCD IN RCRD: CPT | Performed by: ORTHOPAEDIC SURGERY

## 2024-12-12 PROCEDURE — 1123F ACP DISCUSS/DSCN MKR DOCD: CPT | Performed by: ORTHOPAEDIC SURGERY

## 2024-12-12 PROCEDURE — 99203 OFFICE O/P NEW LOW 30 MIN: CPT | Performed by: ORTHOPAEDIC SURGERY

## 2024-12-12 PROCEDURE — G8419 CALC BMI OUT NRM PARAM NOF/U: HCPCS | Performed by: ORTHOPAEDIC SURGERY

## 2024-12-12 PROCEDURE — G8427 DOCREV CUR MEDS BY ELIG CLIN: HCPCS | Performed by: ORTHOPAEDIC SURGERY

## 2024-12-12 PROCEDURE — 1125F AMNT PAIN NOTED PAIN PRSNT: CPT | Performed by: ORTHOPAEDIC SURGERY

## 2024-12-12 PROCEDURE — 4004F PT TOBACCO SCREEN RCVD TLK: CPT | Performed by: ORTHOPAEDIC SURGERY

## 2024-12-12 PROCEDURE — 3017F COLORECTAL CA SCREEN DOC REV: CPT | Performed by: ORTHOPAEDIC SURGERY

## 2024-12-12 NOTE — PROGRESS NOTES
wants to think about a jiffy knee replacement.     Assessment/Plan:  Plan will be for right total knee replacement.  If he decides free to do that.  Preop virtually.     General medical clearance for optimization.  No history of blood clots.  No complication history and we will go from there.    As part of continued conservative pain management options the patient was advised to utilize Tylenol or OTC NSAIDS as long as it is not medically contraindicated.     Return to Office:    Follow-up and Dispositions    Return if symptoms worsen or fail to improve.        Scribed by Dory Constantino MA as dictated by Harvinder Vigil MD.  Documentation, performed by, True and Accepted Harvinder Vigil MD

## 2024-12-13 ENCOUNTER — TELEPHONE (OUTPATIENT)
Age: 69
End: 2024-12-13

## 2024-12-13 NOTE — TELEPHONE ENCOUNTER
Patient was seen for right knee pain on 12/12/24.    He has called in stating he was ready to proceed with scheduling his surgery.            HPI:  The patient is here with a chief complaint of right knee pain, throbbing, burning pain, diagnosed with osteoarthritis.  Continues to have difficulty.  Failed conservative treatment.  He wants to think about a jiffy knee replacement.      Assessment/Plan:  Plan will be for right total knee replacement.  If he decides free to do that.  Preop virtually.      General medical clearance for optimization.  No history of blood clots.  No complication history and we will go from there.

## 2024-12-17 DIAGNOSIS — M17.11 PRIMARY OSTEOARTHRITIS OF RIGHT KNEE: Primary | ICD-10-CM

## (undated) DEVICE — CATHETER URETH 22FR 30CC BLLN F 3 W SPEC M RND TIP TWO

## (undated) DEVICE — BULB SYRINGE, IRRIGATION WITH PROTECTIVE CAP, 60 CC, INDIVIDUALLY WRAPPED: Brand: DOVER

## (undated) DEVICE — TUBING IRRIG L96IN DIA0.241IN L BOR T-U-R W/ NVENT PIERCING

## (undated) DEVICE — GUARDIAN LVC: Brand: GUARDIAN

## (undated) DEVICE — HANDLE LT SNAP ON ULT DURABLE LENS FOR TRUMPF ALC DISPOSABLE

## (undated) DEVICE — STRAP SECUREMENT L AD W1.25XL2.75IN CATH ADH CATH-SECURE

## (undated) DEVICE — GOWN,PLEAT,SPECIALTY,XL,STRL: Brand: MEDLINE

## (undated) DEVICE — SYR 10ML LUER LOK 1/5ML GRAD --

## (undated) DEVICE — SINGLE BASIN: Brand: CARDINAL HEALTH

## (undated) DEVICE — STERILE POLYISOPRENE POWDER-FREE SURGICAL GLOVES: Brand: PROTEXIS

## (undated) DEVICE — TOWEL SURG W17XL27IN STD BLU COT NONFENESTRATED PREWASHED

## (undated) DEVICE — CUTTING ELECTRODE BIPO 24FR 12/30°  FOR RESECTOSCOPES, TELESCOPE Ø 4MM, FOR SHEATHS, INTERMITTENT/CONTINUOUS IRRIGATION, 24/26, FR, LOOP: ROUND, WIRE Ø 0.3MM, FORK COLOR BLUE, STEM COLOR BLUE, PACK=3 PCS, FOR SHARK AND S-LINE RESECTOSCOPES, STERILE, FOR SINGLE USE: Brand: SHARK/S-LINE

## (undated) DEVICE — JELLY,LUBE,STERILE,FLIP TOP,TUBE,4-OZ: Brand: MEDLINE

## (undated) DEVICE — DEVON™ KNEE AND BODY STRAP 60" X 3" (1.5 M X 7.6 CM): Brand: DEVON

## (undated) DEVICE — SYR LR LCK 1ML GRAD NSAF 30ML --

## (undated) DEVICE — MEDI-VAC NON-CONDUCTIVE SUCTION TUBING: Brand: CARDINAL HEALTH

## (undated) DEVICE — KENDALL SCD EXPRESS SLEEVES, KNEE LENGTH, MEDIUM: Brand: KENDALL SCD

## (undated) DEVICE — INFECTION CONTROL KIT SYS

## (undated) DEVICE — LEGGINGS: Brand: CONVERTORS

## (undated) DEVICE — SOLUTION IRRIG 3000ML 0.9% SOD CHL FLX CONT 0797208] ICU MEDICAL INC]

## (undated) DEVICE — BAG COLLECTION FLD OR-TBL NS --

## (undated) DEVICE — BAG DRNGE 4000ML CONT IRRIG ROUNDED TEARDROP SHP DISP

## (undated) DEVICE — CYSTOSCOPY PACK: Brand: CONVERTORS

## (undated) DEVICE — SOLUTION SCRB 4OZ 10% PVP I POVIDONE IOD TOP PAINT EXIDINE